# Patient Record
Sex: MALE | Race: WHITE | NOT HISPANIC OR LATINO | Employment: OTHER | URBAN - METROPOLITAN AREA
[De-identification: names, ages, dates, MRNs, and addresses within clinical notes are randomized per-mention and may not be internally consistent; named-entity substitution may affect disease eponyms.]

---

## 2024-01-18 PROBLEM — E53.8 B12 DEFICIENCY: Status: ACTIVE | Noted: 2024-01-18

## 2024-01-18 PROBLEM — M81.8 OTHER OSTEOPOROSIS WITHOUT CURRENT PATHOLOGICAL FRACTURE: Status: ACTIVE | Noted: 2024-01-18

## 2024-01-18 PROBLEM — K21.9 GASTROESOPHAGEAL REFLUX DISEASE WITHOUT ESOPHAGITIS: Status: ACTIVE | Noted: 2024-01-18

## 2024-01-18 PROBLEM — N40.0 BENIGN PROSTATIC HYPERPLASIA WITHOUT LOWER URINARY TRACT SYMPTOMS: Status: ACTIVE | Noted: 2024-01-18

## 2024-01-18 PROBLEM — K52.9 COLITIS: Status: ACTIVE | Noted: 2024-01-18

## 2024-01-18 PROBLEM — G47.09 OTHER INSOMNIA: Status: ACTIVE | Noted: 2024-01-18

## 2024-01-18 PROBLEM — G35 MULTIPLE SCLEROSIS (HCC): Status: ACTIVE | Noted: 2024-01-18

## 2024-01-18 PROBLEM — R26.2 AMBULATORY DYSFUNCTION: Status: ACTIVE | Noted: 2024-01-18

## 2024-01-18 PROBLEM — M48.061 SPINAL STENOSIS OF LUMBAR REGION: Status: ACTIVE | Noted: 2024-01-18

## 2024-01-18 PROBLEM — E55.9 VITAMIN D DEFICIENCY: Status: ACTIVE | Noted: 2024-01-18

## 2024-01-18 PROBLEM — K59.09 OTHER CONSTIPATION: Status: ACTIVE | Noted: 2024-01-18

## 2024-01-18 PROBLEM — K52.9 COLITIS: Status: RESOLVED | Noted: 2024-01-18 | Resolved: 2024-01-18

## 2024-01-18 PROBLEM — Z59.82 LACK OF ACCESS TO TRANSPORTATION: Status: ACTIVE | Noted: 2024-01-18

## 2024-01-18 PROBLEM — R60.0 BILATERAL LOWER EXTREMITY EDEMA: Status: ACTIVE | Noted: 2024-01-18

## 2024-01-19 ENCOUNTER — TELEPHONE (OUTPATIENT)
Age: 59
End: 2024-01-19

## 2024-01-19 NOTE — TELEPHONE ENCOUNTER
Michele from [facility] wanted to inform doctor that patient will be receiving care starting on 01/23/2024.

## 2024-01-22 NOTE — TELEPHONE ENCOUNTER
Patient contacted the office this morning looking for the name of the PT referral that he was referred to. [facility] health information given, phone number [phone]. Patient verbalized understanding.

## 2024-01-29 DIAGNOSIS — E53.8 B12 DEFICIENCY: ICD-10-CM

## 2024-01-29 DIAGNOSIS — R60.0 BILATERAL LOWER EXTREMITY EDEMA: Primary | ICD-10-CM

## 2024-01-29 DIAGNOSIS — E55.9 VITAMIN D DEFICIENCY: Primary | ICD-10-CM

## 2024-01-29 NOTE — TELEPHONE ENCOUNTER
Medication previously prescribed by last pcp and needs it switched.    Reason for call:   [x] Refill   [] Prior Auth  [] Other:     Office:   [x] PCP/Provider -   [] Specialty/Provider -     Medication:   Furosemide 20mg- take 1 tablet by mouth every morning      Pharmacy: [facility]    Does the patient have enough for 3 days?   [] Yes   [x] No - Send as HP to POD

## 2024-01-29 NOTE — TELEPHONE ENCOUNTER
Reason for call:   [x] Refill   [] Prior Auth  [] Other:     Office:   [x] PCP/Provider -   [] Specialty/Provider -     Medication: Cholecalciferol 400 mg units, take 400 units by mouth daily                      Vitamin B-12 1000 mcg, take 1000 mcg by mouth daily       Pharmacy: [facility]    Does the patient have enough for 3 days?   [] Yes   [x] No - Send as HP to POD

## 2024-01-30 ENCOUNTER — HOSPITAL ENCOUNTER (OUTPATIENT)
Dept: NON INVASIVE DIAGNOSTICS | Facility: HOSPITAL | Age: 59
Discharge: HOME | End: 2024-01-30
Payer: COMMERCIAL

## 2024-01-30 ENCOUNTER — HOSPITAL ENCOUNTER (OUTPATIENT)
Dept: RADIOLOGY | Facility: CLINIC | Age: 59
Discharge: HOME | End: 2024-01-30
Payer: COMMERCIAL

## 2024-01-30 VITALS
WEIGHT: 205.03 LBS | BODY MASS INDEX: 28.7 KG/M2 | SYSTOLIC BLOOD PRESSURE: 100 MMHG | DIASTOLIC BLOOD PRESSURE: 76 MMHG | HEIGHT: 71 IN

## 2024-01-30 DIAGNOSIS — M81.8 OTHER OSTEOPOROSIS WITHOUT CURRENT PATHOLOGICAL FRACTURE: ICD-10-CM

## 2024-01-30 DIAGNOSIS — R60.0 BILATERAL LOWER EXTREMITY EDEMA: ICD-10-CM

## 2024-01-30 LAB
AORTIC ROOT: 3.8 CM
AORTIC VALVE MEAN VELOCITY: 7.6 M/S
APICAL FOUR CHAMBER EJECTION FRACTION: 52 %
ASCENDING AORTA: 3.1 CM
AV AREA BY CONTINUOUS VTI: 3.3 CM2
AV AREA PEAK VELOCITY: 4.1 CM2
AV LVOT MEAN GRADIENT: 2 MMHG
AV LVOT PEAK GRADIENT: 3 MMHG
AV MEAN GRADIENT: 3 MMHG
AV PEAK GRADIENT: 4 MMHG
AV VALVE AREA: 3.32 CM2
AV VELOCITY RATIO: 0.91
BSA FOR ECHO PROCEDURE: 2.13 M2
DOP CALC AO PEAK VEL: 1.02 M/S
DOP CALC AO VTI: 26.9 CM
DOP CALC LVOT AREA: 4.52 CM2
DOP CALC LVOT CARDIAC INDEX: 3.22 L/MIN/M2
DOP CALC LVOT CARDIAC OUTPUT: 6.86 L/MIN
DOP CALC LVOT DIAMETER: 2.4 CM
DOP CALC LVOT PEAK VEL VTI: 19.75 CM
DOP CALC LVOT PEAK VEL: 0.93 M/S
DOP CALC LVOT STROKE INDEX: 40.4 ML/M2
DOP CALC LVOT STROKE VOLUME: 89.3
E WAVE DECELERATION TIME: 210 MS
E/A RATIO: 1.31
FRACTIONAL SHORTENING: 35 (ref 28–44)
INTERVENTRICULAR SEPTUM IN DIASTOLE (PARASTERNAL SHORT AXIS VIEW): 1.3 CM
INTERVENTRICULAR SEPTUM: 1.3 CM (ref 0.6–1.1)
LAAS-AP2: 26.2 CM2
LAAS-AP4: 21.1 CM2
LEFT ATRIUM SIZE: 3.4 CM
LEFT ATRIUM VOLUME (MOD BIPLANE): 77 ML
LEFT ATRIUM VOLUME INDEX (MOD BIPLANE): 36.2 ML/M2
LEFT INTERNAL DIMENSION IN SYSTOLE: 3 CM (ref 2.1–4)
LEFT VENTRICLE DIASTOLIC VOLUME (MOD BIPLANE): 109 ML
LEFT VENTRICLE DIASTOLIC VOLUME INDEX (MOD BIPLANE): 51.2 ML/M2
LEFT VENTRICLE SYSTOLIC VOLUME (MOD BIPLANE): 46 ML
LEFT VENTRICLE SYSTOLIC VOLUME INDEX (MOD BIPLANE): 21.6 ML/M2
LEFT VENTRICULAR INTERNAL DIMENSION IN DIASTOLE: 4.6 CM (ref 3.5–6)
LEFT VENTRICULAR POSTERIOR WALL IN END DIASTOLE: 1.3 CM
LEFT VENTRICULAR STROKE VOLUME: 62 ML
LV EF: 58 %
LVSV (TEICH): 62 ML
MV E'TISSUE VEL-LAT: 12 CM/S
MV E'TISSUE VEL-SEP: 8 CM/S
MV PEAK A VEL: 0.49 M/S
MV PEAK E VEL: 64 CM/S
MV STENOSIS PRESSURE HALF TIME: 61 MS
MV VALVE AREA P 1/2 METHOD: 3.61
RIGHT ATRIUM AREA SYSTOLE A4C: 21.2 CM2
RIGHT VENTRICLE ID DIMENSION: 4.4 CM
SL CV LEFT ATRIUM LENGTH A2C: 5.6 CM
SL CV LV EF: 60
SL CV PED ECHO LEFT VENTRICLE DIASTOLIC VOLUME (MOD BIPLANE) 2D: 95 ML
SL CV PED ECHO LEFT VENTRICLE SYSTOLIC VOLUME (MOD BIPLANE) 2D: 34 ML
TRICUSPID ANNULAR PLANE SYSTOLIC EXCURSION: 3.1 CM

## 2024-01-30 PROCEDURE — 77080 DXA BONE DENSITY AXIAL: CPT

## 2024-01-30 PROCEDURE — 93306 TTE W/DOPPLER COMPLETE: CPT | Performed by: INTERNAL MEDICINE

## 2024-01-30 PROCEDURE — 93306 TTE W/DOPPLER COMPLETE: CPT

## 2024-01-30 RX ORDER — LANOLIN ALCOHOL/MO/W.PET/CERES
1000 CREAM (GRAM) TOPICAL DAILY
Qty: 90 TABLET | Refills: 0 | Status: SHIPPED | OUTPATIENT
Start: 2024-01-30

## 2024-01-30 RX ORDER — OMEGA-3S/DHA/EPA/FISH OIL/D3 300MG-1000
400 CAPSULE ORAL DAILY
Qty: 90 TABLET | Refills: 1 | Status: SHIPPED | OUTPATIENT
Start: 2024-01-30

## 2024-01-30 RX ORDER — FUROSEMIDE 20 MG/1
20 TABLET ORAL EVERY MORNING
Qty: 90 TABLET | Refills: 1 | Status: SHIPPED | OUTPATIENT
Start: 2024-01-30

## 2024-01-30 NOTE — TELEPHONE ENCOUNTER
Patient called back confirming RX order for   urosemide (LASIX) 20 mg tablet Take 1 tablet (20 mg total) by mouth every morning     had been sent. Confirmed

## 2024-02-01 ENCOUNTER — TELEPHONE (OUTPATIENT)
Dept: FAMILY MEDICINE CLINIC | Facility: CLINIC | Age: 59
End: 2024-02-01

## 2024-02-01 NOTE — TELEPHONE ENCOUNTER
----- Message from Ko Castro PA-C sent at 2/1/2024 10:19 AM EST -----  Function is normal.  There is mild thickness in the wall of the left ventricle.  There is also mild regurgitation which means that blood mildly moves back from the left ventricle to the left atrium.  ----- Message -----  From: Martha Lopez  Sent: 2/1/2024  10:00 AM EST  To: Ko Castro PA-C    Patient is aware that his DXA is normal.  Would like more interpretation on his ECHO.  He said he saw it in My Chart but didn't understand it. I told him it was normal but he still asked if you can give him more information.

## 2024-02-01 NOTE — TELEPHONE ENCOUNTER
----- Message from Ko Castro PA-C sent at 2/1/2024  7:39 AM EST -----  No osteoporosis seen in lumbar spine or left hip and femur.

## 2024-02-01 NOTE — TELEPHONE ENCOUNTER
----- Message from Ko Castro PA-C sent at 2/1/2024 10:18 AM EST -----  Function is normal.  There is mild regurgitation from the left ventricle back to the left atrium.  Which means blood moves backwards very mildly.  There is very mild wall thickness in the left ventricle as well.  With function being normal, and very mild wall thickness,  ----- Message -----  From: Martha Lopez  Sent: 2/1/2024  10:00 AM EST  To: Ko Castro PA-C    Patient is aware that his DXA is normal.  Would like more interpretation on his ECHO.  He said he saw it in My Chart but didn't understand it. I told him it was normal but he still asked if you can give him more information.

## 2024-02-02 ENCOUNTER — PATIENT OUTREACH (OUTPATIENT)
Dept: FAMILY MEDICINE CLINIC | Facility: CLINIC | Age: 59
End: 2024-02-02

## 2024-02-02 NOTE — PROGRESS NOTES
[facility]OR received a referral from KANWAL Herring to assist patient with applying for transportation services.    FLRO contacted Clyde to discuss the referral. He was completing a PT session and stated he would call this writer back.     If no contact, will outreach again next week.

## 2024-02-04 ENCOUNTER — TELEPHONE (OUTPATIENT)
Dept: OTHER | Facility: OTHER | Age: 59
End: 2024-02-04

## 2024-02-04 DIAGNOSIS — K59.09 OTHER CONSTIPATION: Primary | ICD-10-CM

## 2024-02-05 ENCOUNTER — APPOINTMENT (OUTPATIENT)
Dept: LAB | Facility: HOSPITAL | Age: 59
End: 2024-02-05
Payer: COMMERCIAL

## 2024-02-05 DIAGNOSIS — N40.0 BENIGN PROSTATIC HYPERPLASIA WITHOUT LOWER URINARY TRACT SYMPTOMS: ICD-10-CM

## 2024-02-05 DIAGNOSIS — R60.0 BILATERAL LOWER EXTREMITY EDEMA: ICD-10-CM

## 2024-02-05 DIAGNOSIS — Z12.5 SCREENING FOR MALIGNANT NEOPLASM OF PROSTATE: ICD-10-CM

## 2024-02-05 DIAGNOSIS — Z13.6 SCREENING FOR CARDIOVASCULAR CONDITION: ICD-10-CM

## 2024-02-05 DIAGNOSIS — E55.9 VITAMIN D DEFICIENCY: ICD-10-CM

## 2024-02-05 DIAGNOSIS — E53.8 B12 DEFICIENCY: ICD-10-CM

## 2024-02-05 LAB
25(OH)D3 SERPL-MCNC: 33.9 NG/ML (ref 30–100)
ALBUMIN SERPL BCP-MCNC: 4.3 G/DL (ref 3.5–5)
ALP SERPL-CCNC: 53 U/L (ref 34–104)
ALT SERPL W P-5'-P-CCNC: 17 U/L (ref 7–52)
ANION GAP SERPL CALCULATED.3IONS-SCNC: 5 MMOL/L
AST SERPL W P-5'-P-CCNC: 17 U/L (ref 13–39)
BASOPHILS # BLD AUTO: 0.03 THOUSANDS/ÂΜL (ref 0–0.1)
BASOPHILS NFR BLD AUTO: 1 % (ref 0–1)
BILIRUB SERPL-MCNC: 0.75 MG/DL (ref 0.2–1)
BUN SERPL-MCNC: 20 MG/DL (ref 5–25)
CALCIUM SERPL-MCNC: 9.2 MG/DL (ref 8.4–10.2)
CHLORIDE SERPL-SCNC: 104 MMOL/L (ref 96–108)
CHOLEST SERPL-MCNC: 161 MG/DL
CO2 SERPL-SCNC: 29 MMOL/L (ref 21–32)
CREAT SERPL-MCNC: 0.84 MG/DL (ref 0.6–1.3)
EOSINOPHIL # BLD AUTO: 0.08 THOUSAND/ÂΜL (ref 0–0.61)
EOSINOPHIL NFR BLD AUTO: 2 % (ref 0–6)
ERYTHROCYTE [DISTWIDTH] IN BLOOD BY AUTOMATED COUNT: 11.9 % (ref 11.6–15.1)
GFR SERPL CREATININE-BSD FRML MDRD: 95 ML/MIN/1.73SQ M
GLUCOSE P FAST SERPL-MCNC: 91 MG/DL (ref 65–99)
HCT VFR BLD AUTO: 41.1 % (ref 36.5–49.3)
HDLC SERPL-MCNC: 47 MG/DL
HGB BLD-MCNC: 14 G/DL (ref 12–17)
IMM GRANULOCYTES # BLD AUTO: 0.01 THOUSAND/UL (ref 0–0.2)
IMM GRANULOCYTES NFR BLD AUTO: 0 % (ref 0–2)
LDLC SERPL CALC-MCNC: 101 MG/DL (ref 0–100)
LYMPHOCYTES # BLD AUTO: 1.71 THOUSANDS/ÂΜL (ref 0.6–4.47)
LYMPHOCYTES NFR BLD AUTO: 33 % (ref 14–44)
MCH RBC QN AUTO: 30.9 PG (ref 26.8–34.3)
MCHC RBC AUTO-ENTMCNC: 34.1 G/DL (ref 31.4–37.4)
MCV RBC AUTO: 91 FL (ref 82–98)
MONOCYTES # BLD AUTO: 0.57 THOUSAND/ÂΜL (ref 0.17–1.22)
MONOCYTES NFR BLD AUTO: 11 % (ref 4–12)
NEUTROPHILS # BLD AUTO: 2.77 THOUSANDS/ÂΜL (ref 1.85–7.62)
NEUTS SEG NFR BLD AUTO: 53 % (ref 43–75)
NONHDLC SERPL-MCNC: 114 MG/DL
NRBC BLD AUTO-RTO: 0 /100 WBCS
PLATELET # BLD AUTO: 178 THOUSANDS/UL (ref 149–390)
PMV BLD AUTO: 9.2 FL (ref 8.9–12.7)
POTASSIUM SERPL-SCNC: 4.3 MMOL/L (ref 3.5–5.3)
PROT SERPL-MCNC: 6.8 G/DL (ref 6.4–8.4)
PSA SERPL-MCNC: 1.7 NG/ML (ref 0–4)
RBC # BLD AUTO: 4.53 MILLION/UL (ref 3.88–5.62)
SODIUM SERPL-SCNC: 138 MMOL/L (ref 135–147)
TRIGL SERPL-MCNC: 65 MG/DL
VIT B12 SERPL-MCNC: 780 PG/ML (ref 180–914)
WBC # BLD AUTO: 5.17 THOUSAND/UL (ref 4.31–10.16)

## 2024-02-05 PROCEDURE — 36415 COLL VENOUS BLD VENIPUNCTURE: CPT

## 2024-02-05 PROCEDURE — 82607 VITAMIN B-12: CPT

## 2024-02-05 PROCEDURE — 80061 LIPID PANEL: CPT

## 2024-02-05 PROCEDURE — 80053 COMPREHEN METABOLIC PANEL: CPT

## 2024-02-05 PROCEDURE — 85025 COMPLETE CBC W/AUTO DIFF WBC: CPT

## 2024-02-05 PROCEDURE — 82306 VITAMIN D 25 HYDROXY: CPT

## 2024-02-05 PROCEDURE — G0103 PSA SCREENING: HCPCS

## 2024-02-05 RX ORDER — SACCHAROMYCES BOULARDII 250 MG
250 CAPSULE ORAL 2 TIMES DAILY
Qty: 180 CAPSULE | Refills: 1 | Status: SHIPPED | OUTPATIENT
Start: 2024-02-05 | End: 2024-02-15 | Stop reason: SDUPTHER

## 2024-02-05 NOTE — TELEPHONE ENCOUNTER
Spoke to patient letting him know that probiotics can be bought OTC.  However, he said his insurance has always covered them.  Right now, he is taking Florastor, so he asked if you would send that in so he can see if that will be covered again.  He states he is not having any specific bowel issues, just has always taken a probiotic.

## 2024-02-06 ENCOUNTER — TELEPHONE (OUTPATIENT)
Dept: FAMILY MEDICINE CLINIC | Facility: CLINIC | Age: 59
End: 2024-02-06

## 2024-02-12 DIAGNOSIS — G47.09 OTHER INSOMNIA: ICD-10-CM

## 2024-02-12 DIAGNOSIS — K21.9 GASTROESOPHAGEAL REFLUX DISEASE WITHOUT ESOPHAGITIS: ICD-10-CM

## 2024-02-12 DIAGNOSIS — G35 MULTIPLE SCLEROSIS (HCC): Primary | ICD-10-CM

## 2024-02-12 NOTE — TELEPHONE ENCOUNTER
Reason for call:   [x] Refill   [] Prior Auth  [] Other:     Office:   [x] PCP/Provider -   [] Specialty/Provider -     Medication:     Pregabalin 50 mg capsule taken by mouth 2x daily #60 tabs     Pantoprazole 40 mg tablet taken by mouth once daily #90 tabs     Hydroxyzine 25 mg tablet taken by mouth daily at bedtime #90 tabs       Pharmacy:   [facility] #[id number] - [place] -   [address] [phone]     Does the patient have enough for 3 days?   [x] Yes   [] No - Send as HP to POD

## 2024-02-13 RX ORDER — PANTOPRAZOLE SODIUM 40 MG/1
40 TABLET, DELAYED RELEASE ORAL EVERY MORNING
Qty: 90 TABLET | Refills: 1 | Status: SHIPPED | OUTPATIENT
Start: 2024-02-13 | End: 2024-02-15 | Stop reason: SDUPTHER

## 2024-02-13 RX ORDER — PREGABALIN 50 MG/1
50 CAPSULE ORAL 2 TIMES DAILY
Qty: 60 CAPSULE | Refills: 0 | Status: SHIPPED | OUTPATIENT
Start: 2024-02-13 | End: 2024-02-15 | Stop reason: SDUPTHER

## 2024-02-13 RX ORDER — HYDROXYZINE HYDROCHLORIDE 25 MG/1
25 TABLET, FILM COATED ORAL
Qty: 90 TABLET | Refills: 1 | Status: SHIPPED | OUTPATIENT
Start: 2024-02-13 | End: 2024-02-15 | Stop reason: SDUPTHER

## 2024-02-14 ENCOUNTER — PATIENT OUTREACH (OUTPATIENT)
Dept: FAMILY MEDICINE CLINIC | Facility: CLINIC | Age: 59
End: 2024-02-14

## 2024-02-14 NOTE — PROGRESS NOTES
[facility] contacted Clyde to assist with an application for transportation services.     An application for STS was completed and the certification of disability form was faxed to the PCP office for review and completion. Clyde states he has an appt with the PCP tomorrow and will confirm it was received.     Will complete a chart review next week to obtain the form and email the completed application to [facility] for processing.     Will outreach patient one week after application is submitted for a status update.

## 2024-02-15 ENCOUNTER — OFFICE VISIT (OUTPATIENT)
Dept: FAMILY MEDICINE CLINIC | Facility: CLINIC | Age: 59
End: 2024-02-15
Payer: COMMERCIAL

## 2024-02-15 VITALS
BODY MASS INDEX: 28.6 KG/M2 | HEIGHT: 71 IN | HEART RATE: 68 BPM | SYSTOLIC BLOOD PRESSURE: 102 MMHG | DIASTOLIC BLOOD PRESSURE: 66 MMHG | OXYGEN SATURATION: 99 % | TEMPERATURE: 94.5 F

## 2024-02-15 DIAGNOSIS — R60.0 BILATERAL LOWER EXTREMITY EDEMA: ICD-10-CM

## 2024-02-15 DIAGNOSIS — E53.8 B12 DEFICIENCY: ICD-10-CM

## 2024-02-15 DIAGNOSIS — Z12.11 SCREEN FOR COLON CANCER: ICD-10-CM

## 2024-02-15 DIAGNOSIS — R26.2 AMBULATORY DYSFUNCTION: ICD-10-CM

## 2024-02-15 DIAGNOSIS — G35 MULTIPLE SCLEROSIS (HCC): ICD-10-CM

## 2024-02-15 DIAGNOSIS — K59.09 OTHER CONSTIPATION: ICD-10-CM

## 2024-02-15 DIAGNOSIS — Z00.00 ANNUAL PHYSICAL EXAM: Primary | ICD-10-CM

## 2024-02-15 DIAGNOSIS — E55.9 VITAMIN D DEFICIENCY: ICD-10-CM

## 2024-02-15 DIAGNOSIS — N40.1 BENIGN PROSTATIC HYPERPLASIA WITH LOWER URINARY TRACT SYMPTOMS, SYMPTOM DETAILS UNSPECIFIED: ICD-10-CM

## 2024-02-15 DIAGNOSIS — G47.09 OTHER INSOMNIA: ICD-10-CM

## 2024-02-15 DIAGNOSIS — K21.9 GASTROESOPHAGEAL REFLUX DISEASE WITHOUT ESOPHAGITIS: ICD-10-CM

## 2024-02-15 PROCEDURE — 99396 PREV VISIT EST AGE 40-64: CPT

## 2024-02-15 RX ORDER — PANTOPRAZOLE SODIUM 40 MG/1
40 TABLET, DELAYED RELEASE ORAL EVERY MORNING
Qty: 90 TABLET | Refills: 1 | Status: SHIPPED | OUTPATIENT
Start: 2024-02-15

## 2024-02-15 RX ORDER — OMEGA-3S/DHA/EPA/FISH OIL/D3 300MG-1000
400 CAPSULE ORAL DAILY
Qty: 90 TABLET | Refills: 1 | Status: SHIPPED | OUTPATIENT
Start: 2024-02-15

## 2024-02-15 RX ORDER — HYDROXYZINE HYDROCHLORIDE 25 MG/1
25 TABLET, FILM COATED ORAL
Qty: 90 TABLET | Refills: 1 | Status: SHIPPED | OUTPATIENT
Start: 2024-02-15

## 2024-02-15 RX ORDER — PREGABALIN 50 MG/1
50 CAPSULE ORAL 2 TIMES DAILY
Qty: 60 CAPSULE | Refills: 0 | Status: SHIPPED | OUTPATIENT
Start: 2024-02-15

## 2024-02-15 RX ORDER — FUROSEMIDE 20 MG/1
20 TABLET ORAL EVERY MORNING
Qty: 90 TABLET | Refills: 1 | Status: SHIPPED | OUTPATIENT
Start: 2024-02-15

## 2024-02-15 RX ORDER — LANOLIN ALCOHOL/MO/W.PET/CERES
1000 CREAM (GRAM) TOPICAL DAILY
Qty: 90 TABLET | Refills: 0 | Status: SHIPPED | OUTPATIENT
Start: 2024-02-15

## 2024-02-15 RX ORDER — SACCHAROMYCES BOULARDII 250 MG
250 CAPSULE ORAL 2 TIMES DAILY
Qty: 180 CAPSULE | Refills: 1 | Status: SHIPPED | OUTPATIENT
Start: 2024-02-15

## 2024-02-15 RX ORDER — DOCUSATE SODIUM -SENNOSIDES 50; 8.6 MG/1; MG/1
1 TABLET, COATED ORAL 2 TIMES DAILY
COMMUNITY
Start: 2023-12-11 | End: 2024-02-15

## 2024-02-15 RX ORDER — TAMSULOSIN HYDROCHLORIDE 0.4 MG/1
0.8 CAPSULE ORAL
Qty: 90 CAPSULE | Refills: 1 | Status: SHIPPED | OUTPATIENT
Start: 2024-02-15 | End: 2024-02-20 | Stop reason: SDUPTHER

## 2024-02-15 RX ORDER — SENNOSIDES A AND B 8.6 MG/1
1 TABLET, FILM COATED ORAL 2 TIMES DAILY
Qty: 60 TABLET | Refills: 2 | Status: SHIPPED | OUTPATIENT
Start: 2024-02-15

## 2024-02-15 NOTE — ASSESSMENT & PLAN NOTE
Patient has significant ambulatory dysfunction and needs help due to activities of daily life.  Patient currently gets help from niece and nephew to complete these, and he has at home PT/OT.

## 2024-02-15 NOTE — ASSESSMENT & PLAN NOTE
Patient has PT/OT and home health and home.  Patient is not currently on medication or medical treatment for MS, but has follow-up with neurology planning to schedule after this appointment.  Stressed importance of adequate follow-up to help with progression.  Patient understands and agrees to plan.

## 2024-02-15 NOTE — PROGRESS NOTES
ADULT ANNUAL PHYSICAL  [facility]    NAME: Clyde Malave  AGE: 58 y.o. SEX: male  : 1965     DATE: 2/15/2024     Assessment and Plan:     Problem List Items Addressed This Visit          Digestive    Gastroesophageal reflux disease without esophagitis     Stable. Continue pantoprazole 40 mg daily. Contact office with symptoms.          Relevant Medications    pantoprazole (PROTONIX) 40 mg tablet       Nervous and Auditory    Multiple sclerosis (HCC)     Patient has PT/OT and home health and home.  Patient is not currently on medication or medical treatment for MS, but has follow-up with neurology planning to schedule after this appointment.  Stressed importance of adequate follow-up to help with progression.  Patient understands and agrees to plan.         Relevant Medications    pregabalin (LYRICA) 50 mg capsule       Other    Bilateral lower extremity edema     Echocardiogram and lab work normal.  Patient educated on importance of compression socks and leg elevation along with a low-sodium/sugar diet.  Patient is to contact office or go to emergency room if shortness of breath or severe leg swelling occurs.         Relevant Medications    furosemide (LASIX) 20 mg tablet    Other constipation     Stable.  Continue senna 2 times daily.         Relevant Medications    saccharomyces boulardii (FLORASTOR) 250 mg capsule    senna (SENOKOT) 8.6 MG tablet    B12 deficiency     Stable.  Continue B12 supplement.         Relevant Medications    vitamin B-12 (VITAMIN B-12) 1,000 mcg tablet    Vitamin D deficiency     Stable.  Continue D3 supplement.         Relevant Medications    cholecalciferol (VITAMIN D3) 400 units tablet    Other insomnia     Stable on hydroxyzine 25 mg at bedtime. Continue treatment.          Relevant Medications    hydrOXYzine HCL (ATARAX) 25 mg tablet    Ambulatory dysfunction     Patient has significant ambulatory dysfunction and  needs help due to activities of daily life.  Patient currently gets help from niece and nephew to complete these, and he has at home PT/OT.          Other Visit Diagnoses       Annual physical exam    -  Primary    Benign prostatic hyperplasia with lower urinary tract symptoms, symptom details unspecified        Relevant Medications    tamsulosin (FLOMAX) 0.4 mg    Screen for colon cancer                Immunizations and preventive care screenings were discussed with patient today. Appropriate education was printed on patient's after visit summary.    Discussed risks and benefits of prostate cancer screening. We discussed the controversial history of PSA screening for prostate cancer in the [place] as well as the risk of over detection and over treatment of prostate cancer by way of PSA screening.  The patient understands that PSA blood testing is an imperfect way to screen for prostate cancer and that elevated PSA levels in the blood may also be caused by infection, inflammation, prostatic trauma or manipulation, urological procedures, or by benign prostatic enlargement.    The role of the digital rectal examination in prostate cancer screening was also discussed and I discussed with him that there is large interobserver variability in the findings of digital rectal examination.    Counseling:  Alcohol/drug use: discussed moderation in alcohol intake, the recommendations for healthy alcohol use, and avoidance of illicit drug use.  Dental Health: discussed importance of regular tooth brushing, flossing, and dental visits.  Injury prevention: discussed safety/seat belts, safety helmets, smoke detectors, carbon dioxide detectors, and smoking near bedding or upholstery.  Sexual health: discussed sexually transmitted diseases, partner selection, use of condoms, avoidance of unintended pregnancy, and contraceptive alternatives.  Exercise: the importance of regular exercise/physical activity was discussed. Recommend  exercise 3-5 times per week for at least 30 minutes.          Return in about 3 months (around 5/15/2024) for Next scheduled follow up.     Chief Complaint:     Chief Complaint   Patient presents with    Follow-up     Go over labs, meds and echo.  Has a disability hearing in April      History of Present Illness:     Adult Annual Physical   Patient here for a comprehensive physical exam. The patient reports problems - patient has ambulatory dysfunction and is unable to complete activities of daily life.  His niece and nephew help him complete activities of daily life.  He has urology follow-up scheduled, and is calling neurology after this appointment to schedule a consult with them.  Patient has no other concerns .    Diet and Physical Activity  Diet/Nutrition: well balanced diet, limited junk food, low calorie diet, low fat diet, and low carb diet.   Exercise: no formal exercise and ambulatory dysfunction .      Depression Screening  PHQ-2/9 Depression Screening           General Health  Sleep: sleeps well, gets 7-8 hours of sleep on average, and gets up 3 times nightly for urination .   Hearing: normal - bilateral.  Vision: no vision problems.   Dental: regular dental visits.        Health  Symptoms include: urinary frequency and nocturia    Advanced Care Planning  Do you have an advanced directive? no  Do you have a durable medical power of ? no  ACP document given to patient? no     Review of Systems:     Review of Systems   Constitutional:  Positive for fatigue. Negative for appetite change, chills, diaphoresis and fever.   HENT:  Negative for congestion, ear discharge, ear pain, postnasal drip, rhinorrhea, sinus pressure, sinus pain, sneezing and sore throat.    Eyes:  Negative for pain, discharge, redness, itching and visual disturbance.   Respiratory:  Negative for apnea, cough, chest tightness, shortness of breath and wheezing.    Cardiovascular:  Negative for chest pain, palpitations and leg  swelling.   Gastrointestinal:  Negative for abdominal pain, blood in stool, constipation, diarrhea, nausea and vomiting.   Endocrine: Negative for cold intolerance, heat intolerance, polydipsia and polyuria.   Genitourinary:  Negative for dysuria, flank pain, frequency, hematuria and urgency.   Musculoskeletal:  Positive for gait problem. Negative for arthralgias, back pain, myalgias, neck pain and neck stiffness.   Skin:  Negative for color change and rash.   Allergic/Immunologic: Negative.    Neurological:  Positive for weakness. Negative for dizziness, tremors, seizures, syncope, facial asymmetry, speech difficulty, light-headedness, numbness and headaches.   Hematological:  Negative for adenopathy. Does not bruise/bleed easily.   Psychiatric/Behavioral:  Positive for confusion. Negative for agitation, decreased concentration, dysphoric mood, hallucinations, self-injury, sleep disturbance and suicidal ideas. The patient is not nervous/anxious and is not hyperactive.    All other systems reviewed and are negative.     Past Medical History:     Past Medical History:   Diagnosis Date    Colitis     Enlarged prostate 2021    Gall bladder pain 2023    Multiple sclerosis (HCC) 2000    Osteoporosis     Spinal stenosis 2020    Water retention 12/2023      Past Surgical History:     Past Surgical History:   Procedure Laterality Date    COLONOSCOPY N/A 02/2023    HERNIA REPAIR      INGUINAL HERNIA REPAIR Left       Family History:     Family History   Problem Relation Age of Onset    Stroke Mother     Gallbladder disease Mother     Hypertension Mother     Heart attack Father     Hypertension Sister     Diabetes Brother     No Known Problems Maternal Grandmother     No Known Problems Maternal Grandfather     No Known Problems Paternal Grandmother     No Known Problems Paternal Grandfather     No Known Problems Maternal Aunt     No Known Problems Maternal Uncle     No Known Problems Paternal Aunt     No Known Problems  Paternal Uncle     No Known Problems Cousin     Colon cancer Other       Social History:     Social History     Socioeconomic History    Marital status: Single     Spouse name: None    Number of children: 0    Years of education: None    Highest education level: Doctorate   Occupational History    Occupation: retired   Tobacco Use    Smoking status: Never     Passive exposure: Never    Smokeless tobacco: Never   Vaping Use    Vaping status: Never Used   Substance and Sexual Activity    Alcohol use: Not Currently    Drug use: Never    Sexual activity: Not Currently     Partners: Female   Other Topics Concern    None   Social History Narrative    None     Social Determinants of Health     Financial Resource Strain: Not on file   Food Insecurity: Not on file   Transportation Needs: Not on file   Physical Activity: Not on file   Stress: Not on file   Social Connections: Not on file   Intimate Partner Violence: Not on file   Housing Stability: Not on file      Current Medications:     Current Outpatient Medications   Medication Sig Dispense Refill    cholecalciferol (VITAMIN D3) 400 units tablet Take 1 tablet (400 Units total) by mouth daily 90 tablet 1    furosemide (LASIX) 20 mg tablet Take 1 tablet (20 mg total) by mouth every morning 90 tablet 1    hydrOXYzine HCL (ATARAX) 25 mg tablet Take 1 tablet (25 mg total) by mouth daily at bedtime 90 tablet 1    pantoprazole (PROTONIX) 40 mg tablet Take 1 tablet (40 mg total) by mouth every morning 90 tablet 1    pregabalin (LYRICA) 50 mg capsule Take 1 capsule (50 mg total) by mouth 2 (two) times a day 60 capsule 0    saccharomyces boulardii (FLORASTOR) 250 mg capsule Take 1 capsule (250 mg total) by mouth 2 (two) times a day 180 capsule 1    senna (SENOKOT) 8.6 MG tablet Take 1 tablet (8.6 mg total) by mouth 2 (two) times a day 60 tablet 2    tamsulosin (FLOMAX) 0.4 mg Take 2 capsules (0.8 mg total) by mouth daily with dinner 90 capsule 1    vitamin B-12 (VITAMIN B-12)  "1,000 mcg tablet Take 1 tablet (1,000 mcg total) by mouth daily 90 tablet 0     No current facility-administered medications for this visit.      Allergies:     Allergies   Allergen Reactions    Penicillins Hives      Physical Exam:     /66 (BP Location: Right arm, Patient Position: Sitting)   Pulse 68   Temp (!) 94.5 °F (34.7 °C) (Tympanic)   Ht 5' 11\" (1.803 m)   SpO2 99%   BMI 28.60 kg/m²     Physical Exam  Vitals and nursing note reviewed.   Constitutional:       General: He is not in acute distress.     Appearance: Normal appearance. He is well-developed and normal weight. He is not ill-appearing, toxic-appearing or diaphoretic.   HENT:      Head: Normocephalic and atraumatic.      Right Ear: Tympanic membrane normal.      Left Ear: Tympanic membrane normal.      Nose: Nose normal.      Mouth/Throat:      Mouth: Mucous membranes are moist.      Pharynx: Oropharynx is clear.   Eyes:      Extraocular Movements: Extraocular movements intact.      Conjunctiva/sclera: Conjunctivae normal.      Pupils: Pupils are equal, round, and reactive to light.   Cardiovascular:      Rate and Rhythm: Normal rate and regular rhythm.      Pulses: Normal pulses.      Heart sounds: Normal heart sounds. No murmur heard.  Pulmonary:      Effort: Pulmonary effort is normal. No respiratory distress.      Breath sounds: Normal breath sounds. No wheezing.   Chest:      Chest wall: No tenderness.   Abdominal:      General: Bowel sounds are normal.      Palpations: Abdomen is soft. There is no mass.      Tenderness: There is no abdominal tenderness.   Musculoskeletal:         General: No swelling or tenderness. Normal range of motion.      Cervical back: Normal range of motion and neck supple. No tenderness.      Right lower leg: No edema.      Left lower leg: No edema.   Lymphadenopathy:      Cervical: No cervical adenopathy.   Skin:     General: Skin is warm and dry.      Capillary Refill: Capillary refill takes less than 2 " seconds.      Findings: No erythema or rash.   Neurological:      General: No focal deficit present.      Mental Status: He is alert and oriented to person, place, and time. Mental status is at baseline.      Cranial Nerves: No cranial nerve deficit.      Motor: Weakness present.      Coordination: Coordination abnormal.      Gait: Gait abnormal.   Psychiatric:         Mood and Affect: Mood normal.         Behavior: Behavior normal.         Thought Content: Thought content normal.         Judgment: Judgment normal.          Ko Castro PA-C  [facility]

## 2024-02-15 NOTE — ASSESSMENT & PLAN NOTE
Echocardiogram and lab work normal.  Patient educated on importance of compression socks and leg elevation along with a low-sodium/sugar diet.  Patient is to contact office or go to emergency room if shortness of breath or severe leg swelling occurs.

## 2024-02-20 ENCOUNTER — NURSE TRIAGE (OUTPATIENT)
Age: 59
End: 2024-02-20

## 2024-02-20 ENCOUNTER — CONSULT (OUTPATIENT)
Dept: UROLOGY | Facility: CLINIC | Age: 59
End: 2024-02-20
Payer: COMMERCIAL

## 2024-02-20 VITALS
SYSTOLIC BLOOD PRESSURE: 110 MMHG | HEIGHT: 71 IN | HEART RATE: 80 BPM | TEMPERATURE: 98 F | DIASTOLIC BLOOD PRESSURE: 70 MMHG | OXYGEN SATURATION: 99 % | WEIGHT: 215.2 LBS | BODY MASS INDEX: 30.13 KG/M2

## 2024-02-20 DIAGNOSIS — N40.1 BENIGN PROSTATIC HYPERPLASIA WITH LOWER URINARY TRACT SYMPTOMS, SYMPTOM DETAILS UNSPECIFIED: ICD-10-CM

## 2024-02-20 DIAGNOSIS — N40.0 BENIGN PROSTATIC HYPERPLASIA WITHOUT LOWER URINARY TRACT SYMPTOMS: ICD-10-CM

## 2024-02-20 PROCEDURE — 99203 OFFICE O/P NEW LOW 30 MIN: CPT | Performed by: UROLOGY

## 2024-02-20 RX ORDER — TAMSULOSIN HYDROCHLORIDE 0.4 MG/1
0.4 CAPSULE ORAL 2 TIMES DAILY
Qty: 180 CAPSULE | Refills: 3 | Status: SHIPPED | OUTPATIENT
Start: 2024-02-20

## 2024-02-20 RX ORDER — TAMSULOSIN HYDROCHLORIDE 0.4 MG/1
0.8 CAPSULE ORAL 2 TIMES DAILY
Qty: 90 CAPSULE | Refills: 3 | Status: SHIPPED | OUTPATIENT
Start: 2024-02-20 | End: 2024-02-20 | Stop reason: SDUPTHER

## 2024-02-20 NOTE — TELEPHONE ENCOUNTER
"Answer Assessment - Initial Assessment Questions  1. REASON FOR CALL or QUESTION: \"What is your reason for calling today?\" or \"How can I best help you?\" or \"What question do you have that I can help answer?\"      Pt called in stating he was seen in office today and was under the impression that he was to be taking 1 flomax in the morning and 1 in the evening. States his script says take 2 in the morning and 2 in the evening. He would like to know if this was a mistake or if this was part of the plan. Please advise.    Protocols used: Information Only Call - No Triage-ADULT-[place]    "

## 2024-02-20 NOTE — TELEPHONE ENCOUNTER
Pt called back. Reviewed message w/pt . He will take one Flomax tablet in the morning and one tablet in the evening.

## 2024-02-20 NOTE — TELEPHONE ENCOUNTER
thanks for catching that. i [facility] should be tamsulosin 0.4mg capsule take one capsule AM and one capsule PM  total 180 capsules for 3 months with 3 refills for the year

## 2024-02-20 NOTE — PROGRESS NOTES
UROLOGY PROGRESS NOTE         NAME: Clyde Malave  AGE: 58 y.o. SEX: male  : 1965   MRN: [id number]    DATE: 2024  TIME: 10:47 AM    Assessment and Plan      Impression:   1. Benign prostatic hyperplasia without lower urinary tract symptoms  -     Ambulatory Referral to Urology    2. Benign prostatic hyperplasia with lower urinary tract symptoms, symptom details unspecified  -     tamsulosin (FLOMAX) 0.4 mg; Take 2 capsules (0.8 mg total) by mouth 2 (two) times a day    Moderate BPH symptoms.  Symptoms are bothersome to him today despite taking 2 Flomax pills in the evening.  After discussing options he would like to proceed with a Rezum procedure.  We will need his records first     Plan: Obtain his urologic records.  Sounds like he seen 3 previous urologist.  Consider Rezum procedure once that is reviewed.  Take the Flomax twice daily instead of 2 tablets in the evening.      Chief Complaint     Chief Complaint   Patient presents with    Benign Prostatic Hypertrophy    Nocturia     History of Present Illness     HPI: Clyde Malave is a 58 y.o. year old male who presents with history of BPH.  He developed urinary retention couple years ago.  He has had MS for over 20 years.  He has some ambulatory dysfunction.  He went any retention he has been on twice daily Flomax for some time now he seen multiple urologist as he is moved from place to place.  Currently living with his niece and nephew.  Previously scheduled for a Rezum procedure.  This was canceled when he moved to this area.  He had cystoscopy.  He had PVRs.  Currently his AUA symptom score is 12-13.  Nocturia 2-3 times per night.  Slow flow intermittent flow at times.  Normal PSA 1.7 he is also urodynamic testing in the past couple times in [place].  Will see him back in a few weeks get him signed up for care everywhere so we can have access to these records.              The following portions of the patient's history were reviewed and  "updated as appropriate: allergies, current medications, past family history, past medical history, past social history, past surgical history and problem list.  Past Medical History:   Diagnosis Date    Colitis     Enlarged prostate 2021    Gall bladder pain 2023    Multiple sclerosis (HCC) 2000    Osteoporosis     Spinal stenosis 2020    Water retention 12/2023     Past Surgical History:   Procedure Laterality Date    COLONOSCOPY N/A 02/2023    HERNIA REPAIR      INGUINAL HERNIA REPAIR Left      shoulder  Review of Systems     Const: Denies chills, fever and weight loss.  CV: Denies chest pain.  Resp: Denies SOB.  GI: Denies abdominal pain, nausea and vomiting.  : Denies symptoms other than stated above.  Musculo: Denies back pain.    Objective   /70   Pulse 80   Temp 98 °F (36.7 °C)   Ht 5' 11\" (1.803 m)   Wt 97.6 kg (215 lb 3.2 oz)   SpO2 99%   BMI 30.01 kg/m²     Physical Exam  Const: Appears healthy and well developed. No signs of acute distress present.  Resp: Respirations are regular and unlabored.   CV: Rate is regular. Rhythm is regular.  Abdomen: Abdomen is soft, nontender, and nondistended. Kidneys are not palpable.  : Penis testicles epididymis normal.  No hernias.  No SP tenderness no CVA tenderness.  Prostate 1+ benign.  Psych: Patient's attitude is cooperative. Mood is normal. Affect is normal.    Current Medications     Current Outpatient Medications:     cholecalciferol (VITAMIN D3) 400 units tablet, Take 1 tablet (400 Units total) by mouth daily, Disp: 90 tablet, Rfl: 1    furosemide (LASIX) 20 mg tablet, Take 1 tablet (20 mg total) by mouth every morning, Disp: 90 tablet, Rfl: 1    hydrOXYzine HCL (ATARAX) 25 mg tablet, Take 1 tablet (25 mg total) by mouth daily at bedtime, Disp: 90 tablet, Rfl: 1    pantoprazole (PROTONIX) 40 mg tablet, Take 1 tablet (40 mg total) by mouth every morning, Disp: 90 tablet, Rfl: 1    pregabalin (LYRICA) 50 mg capsule, Take 1 capsule (50 mg total) by " mouth 2 (two) times a day, Disp: 60 capsule, Rfl: 0    saccharomyces boulardii (FLORASTOR) 250 mg capsule, Take 1 capsule (250 mg total) by mouth 2 (two) times a day, Disp: 180 capsule, Rfl: 1    senna (SENOKOT) 8.6 MG tablet, Take 1 tablet (8.6 mg total) by mouth 2 (two) times a day, Disp: 60 tablet, Rfl: 2    tamsulosin (FLOMAX) 0.4 mg, Take 2 capsules (0.8 mg total) by mouth 2 (two) times a day, Disp: 90 capsule, Rfl: 3    vitamin B-12 (VITAMIN B-12) 1,000 mcg tablet, Take 1 tablet (1,000 mcg total) by mouth daily, Disp: 90 tablet, Rfl: 0        Frank D'Amico, MD

## 2024-02-21 ENCOUNTER — TELEPHONE (OUTPATIENT)
Age: 59
End: 2024-02-21

## 2024-02-21 NOTE — TELEPHONE ENCOUNTER
Spoke to Shweta at [facility] & she will have their RN call so I can clarify his order.  Please transfer call directly to me.  Also faxed office notes as requested.

## 2024-02-21 NOTE — TELEPHONE ENCOUNTER
Shweta from [facility] called regarding the referral they received. Please fax over the office visit notes from the 2/15/24 visit to [phone] Attn Shweta.  She is also asking for clarification on the order that was placed as to what is being ordered.  Please return her call.

## 2024-02-22 ENCOUNTER — TELEPHONE (OUTPATIENT)
Age: 59
End: 2024-02-22

## 2024-02-22 NOTE — TELEPHONE ENCOUNTER
Dr. Felix's office called patient stated they never received fax for referral, requesting someone call the office to work out getting referral       >>Patient contacted the office this morning asking if a new referral for Neurology can be placed in chart and faxed. There is a referral that is in patients chart from 01/18/24 for Neurology to see Dr. Marcos Rodriguez, but PT recommended Dr. Sanju Felix as he could get him in sooner than July. Patient attempted to schedule appt, office is requesting a referral to be sent to their office. Phone number: [phone] Fax number: [phone]. Please let patient know once complete, patient will then contact office back to set up Neurology appt.

## 2024-02-22 NOTE — TELEPHONE ENCOUNTER
Patient calling because neuro referral was not received. Asking for it to be faxed again to this number [phone]

## 2024-02-22 NOTE — TELEPHONE ENCOUNTER
Patient contacted the office this morning asking if a new referral for Neurology can be placed in chart and faxed. There is a referral that is in patients chart from 01/18/24 for Neurology to see Dr. Marcos Rodriguez, but PT recommended Dr. Sanju Felix as he could get him in sooner than July. Patient attempted to schedule appt, office is requesting a referral to be sent to their office. Phone number: [phone] Fax number: [phone]. Please let patient know once complete, patient will then contact office back to set up Neurology appt.

## 2024-02-26 ENCOUNTER — PATIENT OUTREACH (OUTPATIENT)
Dept: FAMILY MEDICINE CLINIC | Facility: CLINIC | Age: 59
End: 2024-02-26

## 2024-02-26 NOTE — PROGRESS NOTES
[facility] contacted [facility] for a status update on the transportation application submitted 2/16/2024. Per [place], patient is approved for PWD services and can utilize the door to door service as long as there is no bus stop within 3/4 miles of his home.   A welcome packet was mailed to his home with information and instructions for scheduling rides.    [facility] attempted to contact Clyde with the above information. No answer. Left message on voicemail with reason for call, the above information, and a request for a call back with any questions of further needs. Informed the referral will remain open for two weeks and if no contact, referral will be closed. He may contact this writer or his PCP for any future needs.     Referral will remain open until end of day 3/11/24 unless patient expressed further needs. No further outreaches will be made.

## 2024-02-27 NOTE — TELEPHONE ENCOUNTER
Spoke to Dr. Felix's office and the first available appointment they have is 8/9/24 at 11:30 am.  He will be put on the cancellation list.  Called patient to let him know.  Patient verbalized understanding.

## 2024-02-27 NOTE — TELEPHONE ENCOUNTER
Pt returning Martha's call. He stated any day during the week is ok for appt as long as it can be between the hours of 10am-2pm. Please call back with appt details at [phone]. Thank you.

## 2024-02-27 NOTE — TELEPHONE ENCOUNTER
Left message for patient to return call to the office regarding his Neurology appointment.  I wanted to know if he needed a certain day or time for his Neurology appointment before I called the office to schedule it.

## 2024-02-27 NOTE — TELEPHONE ENCOUNTER
Patient called in stating that after speaking with the Neurology this morning, they told him that his PCP needs to be the one calling to schedule his first appointment . Dr Sanju Felxi's office [phone]. Please advise .

## 2024-02-28 ENCOUNTER — TELEPHONE (OUTPATIENT)
Dept: OTHER | Facility: OTHER | Age: 59
End: 2024-02-28

## 2024-02-28 ENCOUNTER — TELEPHONE (OUTPATIENT)
Age: 59
End: 2024-02-28

## 2024-02-28 DIAGNOSIS — J06.9 UPPER RESPIRATORY TRACT INFECTION, UNSPECIFIED TYPE: Primary | ICD-10-CM

## 2024-02-28 RX ORDER — AZITHROMYCIN 250 MG/1
TABLET, FILM COATED ORAL DAILY
Qty: 6 TABLET | Refills: 0 | Status: SHIPPED | OUTPATIENT
Start: 2024-02-28 | End: 2024-03-04

## 2024-02-28 NOTE — TELEPHONE ENCOUNTER
Patient would like the office to give him a call back. He has been having cold symptoms and wanted to see if he would possibly be able to get something for it. His best callback number is [phone].

## 2024-02-28 NOTE — TELEPHONE ENCOUNTER
Patient called to follow up regarding his earlier phone call requesting meds for cold symptoms.    I spoke to patient.  His symptoms are as follows:    Stuffed nose for about 1 week-was taking [place], but didn't help  Breaking out in sweats-just happened this morning  Chest congestion-Noticed when he got up this morning    Wanted to know if he could get something prescribed.  [facility]

## 2024-02-28 NOTE — TELEPHONE ENCOUNTER
Pt called back to check on the status of his earlier msg left and expecting a return call. Maximiliano transferred the call to practice clinical was answered by ashok. Thanks

## 2024-02-28 NOTE — TELEPHONE ENCOUNTER
Patient called to make sure it is safe to take the Z-pack with the other medications he is on. He would like someone to reach out to a provider and a call back to advise.

## 2024-03-12 DIAGNOSIS — G35 MULTIPLE SCLEROSIS (HCC): ICD-10-CM

## 2024-03-12 RX ORDER — PREGABALIN 50 MG/1
50 CAPSULE ORAL 2 TIMES DAILY
Qty: 60 CAPSULE | Refills: 0 | Status: SHIPPED | OUTPATIENT
Start: 2024-03-12

## 2024-03-12 NOTE — TELEPHONE ENCOUNTER
Patient calling to update PCP on status of Assisted Living. Was supposed to move, but has a Disability Hearing April 24th and has to wait and see what the final verdict is after the hearing and may not be moving. Will keep updated.

## 2024-03-12 NOTE — TELEPHONE ENCOUNTER
Reason for call:   [x] Refill   [] Prior Auth  [] Other:     Office:   [x] PCP/Provider -   [] Specialty/Provider -     Medication:   Pregablin 50mg- take 1 capsule by mouth 2 times a day      Pharmacy: [facility]    Does the patient have enough for 3 days?   [x] Yes   [] No - Send as HP to POD

## 2024-03-18 ENCOUNTER — TELEPHONE (OUTPATIENT)
Age: 59
End: 2024-03-18

## 2024-03-18 NOTE — TELEPHONE ENCOUNTER
Brigitte from [facility] [place] called stating that she had faxed over paperwork to be filled out on 3/5/24. She states she needs this paperwork faxed back by today, along with updated med list and notes. Fax # [phone] Attn: Brigitte. Please advise.

## 2024-03-18 NOTE — TELEPHONE ENCOUNTER
Received call from [facility] informing that she has not received the requested paperwork. Informed that it was re-faxed this afternoon, but I will request a refax. Please refax asap as their office is impudently requesting the information. Or contact office directly at [phone]

## 2024-04-03 DIAGNOSIS — G35 MULTIPLE SCLEROSIS (HCC): ICD-10-CM

## 2024-04-03 DIAGNOSIS — K21.9 GASTROESOPHAGEAL REFLUX DISEASE WITHOUT ESOPHAGITIS: ICD-10-CM

## 2024-04-03 DIAGNOSIS — N40.1 BENIGN PROSTATIC HYPERPLASIA WITH LOWER URINARY TRACT SYMPTOMS, SYMPTOM DETAILS UNSPECIFIED: ICD-10-CM

## 2024-04-03 RX ORDER — TAMSULOSIN HYDROCHLORIDE 0.4 MG/1
0.4 CAPSULE ORAL 2 TIMES DAILY
Qty: 180 CAPSULE | Refills: 1 | Status: SHIPPED | OUTPATIENT
Start: 2024-04-03

## 2024-04-03 RX ORDER — PREGABALIN 50 MG/1
50 CAPSULE ORAL 2 TIMES DAILY
Qty: 180 CAPSULE | Refills: 1 | Status: SHIPPED | OUTPATIENT
Start: 2024-04-03

## 2024-04-03 NOTE — TELEPHONE ENCOUNTER
Reason for call:   [x] Refill   [] Prior Auth  [] Other:     Office:   [x] PCP/Provider - Tamara Dotson PA-C   [] Specialty/Provider -     Medication: tamsulosin (FLOMAX) 0.4 mg     Dose/Frequency: Take 1 capsule (0.4 mg total) by mouth 2 (two) times a day     Quantity: 180 + 1 refill    Pharmacy: [facility]/pharmacy #[id number] - [place] - [address]     Does the patient have enough for 3 days?   [] Yes   [x] No - Send as HP to POD

## 2024-04-03 NOTE — TELEPHONE ENCOUNTER
Reason for call:   [x] Refill   [] Prior Auth  [] Other:     Office:   [x] PCP/Provider - Ko Castro PA-C   [] Specialty/Provider -     Medication: pregabalin (LYRICA) 50 mg capsule     Dose/Frequency: Take 1 capsule (50 mg total) by mouth 2 (two) times a day     Quantity: 180 + 1 refill    Pharmacy: [facility]/pharmacy #[id number] - [place] - [address]     Does the patient have enough for 3 days?   [] Yes   [x] No - Send as HP to POD

## 2024-05-09 ENCOUNTER — TELEPHONE (OUTPATIENT)
Dept: FAMILY MEDICINE CLINIC | Facility: CLINIC | Age: 59
End: 2024-05-09

## 2024-05-09 NOTE — TELEPHONE ENCOUNTER
Pt returning callback. He advised insurance is the same. Added insurance to appt and checked rte. Insurance e-verified.

## 2024-05-09 NOTE — TELEPHONE ENCOUNTER
LVM asking for updated ins info  Please input into chart and remind pt to bring cards into upcoming visit

## 2024-05-13 ENCOUNTER — TELEPHONE (OUTPATIENT)
Dept: OTHER | Facility: OTHER | Age: 59
End: 2024-05-13

## 2024-05-13 NOTE — TELEPHONE ENCOUNTER
Patient Called, request an earlier date and time for Scheduled PHY 05/15/2024. Upon Chart review/appointments, confirmed availability of Providers. Patient request either 05-13/2024, 05/14/2024 of earlier times for appointment. Please advise Patient at  [phone] , if any further questions.

## 2024-05-13 NOTE — TELEPHONE ENCOUNTER
Patient has an apt with Dr. Castro on Wednesday for a physical.  He is looking to reschedule it for tomorrow morning.  I did state that it did not look like the doctor had anything and that he was not in office today.  I stated that I would leave a message for the office. Please call patient back. Thank you

## 2024-05-15 ENCOUNTER — OFFICE VISIT (OUTPATIENT)
Dept: FAMILY MEDICINE CLINIC | Facility: CLINIC | Age: 59
End: 2024-05-15
Payer: COMMERCIAL

## 2024-05-15 VITALS
HEART RATE: 65 BPM | TEMPERATURE: 96.8 F | DIASTOLIC BLOOD PRESSURE: 74 MMHG | WEIGHT: 222 LBS | BODY MASS INDEX: 30.96 KG/M2 | SYSTOLIC BLOOD PRESSURE: 118 MMHG | OXYGEN SATURATION: 98 %

## 2024-05-15 DIAGNOSIS — Z12.11 SCREEN FOR COLON CANCER: ICD-10-CM

## 2024-05-15 DIAGNOSIS — G47.09 OTHER INSOMNIA: ICD-10-CM

## 2024-05-15 DIAGNOSIS — E55.9 VITAMIN D DEFICIENCY: Primary | ICD-10-CM

## 2024-05-15 DIAGNOSIS — H01.002 BLEPHARITIS OF LOWER EYELIDS OF BOTH EYES, UNSPECIFIED TYPE: ICD-10-CM

## 2024-05-15 DIAGNOSIS — E53.8 B12 DEFICIENCY: ICD-10-CM

## 2024-05-15 DIAGNOSIS — G35 MULTIPLE SCLEROSIS (HCC): ICD-10-CM

## 2024-05-15 DIAGNOSIS — K59.09 OTHER CONSTIPATION: ICD-10-CM

## 2024-05-15 DIAGNOSIS — R60.0 BILATERAL LOWER EXTREMITY EDEMA: ICD-10-CM

## 2024-05-15 DIAGNOSIS — K21.9 GASTROESOPHAGEAL REFLUX DISEASE WITHOUT ESOPHAGITIS: ICD-10-CM

## 2024-05-15 DIAGNOSIS — N40.1 BENIGN PROSTATIC HYPERPLASIA WITH LOWER URINARY TRACT SYMPTOMS, SYMPTOM DETAILS UNSPECIFIED: ICD-10-CM

## 2024-05-15 DIAGNOSIS — H01.005 BLEPHARITIS OF LOWER EYELIDS OF BOTH EYES, UNSPECIFIED TYPE: ICD-10-CM

## 2024-05-15 PROCEDURE — 99214 OFFICE O/P EST MOD 30 MIN: CPT

## 2024-05-15 RX ORDER — PANTOPRAZOLE SODIUM 40 MG/1
40 TABLET, DELAYED RELEASE ORAL EVERY MORNING
Qty: 90 TABLET | Refills: 1 | Status: SHIPPED | OUTPATIENT
Start: 2024-05-15

## 2024-05-15 RX ORDER — SENNOSIDES A AND B 8.6 MG/1
1 TABLET, FILM COATED ORAL 2 TIMES DAILY
Qty: 60 TABLET | Refills: 2 | Status: SHIPPED | OUTPATIENT
Start: 2024-05-15

## 2024-05-15 RX ORDER — ERYTHROMYCIN 5 MG/G
0.5 OINTMENT OPHTHALMIC EVERY 6 HOURS SCHEDULED
Qty: 28 G | Refills: 1 | Status: SHIPPED | OUTPATIENT
Start: 2024-05-15

## 2024-05-15 RX ORDER — OMEGA-3S/DHA/EPA/FISH OIL/D3 300MG-1000
400 CAPSULE ORAL DAILY
Qty: 90 TABLET | Refills: 1 | Status: SHIPPED | OUTPATIENT
Start: 2024-05-15

## 2024-05-15 RX ORDER — PREGABALIN 50 MG/1
50 CAPSULE ORAL 2 TIMES DAILY
Qty: 180 CAPSULE | Refills: 1 | Status: SHIPPED | OUTPATIENT
Start: 2024-05-15

## 2024-05-15 RX ORDER — FUROSEMIDE 20 MG/1
20 TABLET ORAL EVERY MORNING
Qty: 90 TABLET | Refills: 1 | Status: SHIPPED | OUTPATIENT
Start: 2024-05-15

## 2024-05-15 RX ORDER — SACCHAROMYCES BOULARDII 250 MG
250 CAPSULE ORAL 2 TIMES DAILY
Qty: 180 CAPSULE | Refills: 1 | Status: SHIPPED | OUTPATIENT
Start: 2024-05-15

## 2024-05-15 RX ORDER — HYDROXYZINE HYDROCHLORIDE 25 MG/1
25 TABLET, FILM COATED ORAL
Qty: 90 TABLET | Refills: 1 | Status: SHIPPED | OUTPATIENT
Start: 2024-05-15

## 2024-05-15 RX ORDER — LANOLIN ALCOHOL/MO/W.PET/CERES
1000 CREAM (GRAM) TOPICAL DAILY
Qty: 90 TABLET | Refills: 0 | Status: SHIPPED | OUTPATIENT
Start: 2024-05-15

## 2024-05-15 RX ORDER — TAMSULOSIN HYDROCHLORIDE 0.4 MG/1
0.4 CAPSULE ORAL 2 TIMES DAILY
Qty: 180 CAPSULE | Refills: 1 | Status: SHIPPED | OUTPATIENT
Start: 2024-05-15

## 2024-05-15 NOTE — ASSESSMENT & PLAN NOTE
Patient has PT/OT and home health.  Patient is not currently on medication or medical treatment for MS, but has follow-up with neurology.  Contact office with any worsening.

## 2024-05-15 NOTE — PROGRESS NOTES
Ambulatory Visit  Name: Clyde Malave      : 1965      MRN: [id number]  Encounter Provider: Ko Castro PA-C  Encounter Date: 5/15/2024   Encounter department: [facility]    Assessment & Plan   1. Vitamin D deficiency  Assessment & Plan:  Stable.  Continue D3 supplement.  Orders:  -     cholecalciferol (VITAMIN D3) 400 units tablet; Take 1 tablet (400 Units total) by mouth daily  2. Bilateral lower extremity edema  Assessment & Plan:  Echocardiogram and lab work normal.  Patient educated on importance of compression socks and leg elevation along with a low-sodium/sugar diet.  Patient is to contact office or go to emergency room if shortness of breath or severe leg swelling occurs.  Orders:  -     furosemide (LASIX) 20 mg tablet; Take 1 tablet (20 mg total) by mouth every morning  3. Other insomnia  Assessment & Plan:  Stable on hydroxyzine 25 mg at bedtime. Continue treatment.   Orders:  -     hydrOXYzine HCL (ATARAX) 25 mg tablet; Take 1 tablet (25 mg total) by mouth daily at bedtime  4. Gastroesophageal reflux disease without esophagitis  Assessment & Plan:  Stable. Continue pantoprazole 40 mg daily. Contact office with symptoms.   Orders:  -     pantoprazole (PROTONIX) 40 mg tablet; Take 1 tablet (40 mg total) by mouth every morning  5. Multiple sclerosis (HCC)  Assessment & Plan:  Patient has PT/OT and home health.  Patient is not currently on medication or medical treatment for MS, but has follow-up with neurology.  Contact office with any worsening.  Orders:  -     pregabalin (LYRICA) 50 mg capsule; Take 1 capsule (50 mg total) by mouth 2 (two) times a day  6. Other constipation  Assessment & Plan:  Stable.  Continue senna 2 times daily.  Orders:  -     saccharomyces boulardii (FLORASTOR) 250 mg capsule; Take 1 capsule (250 mg total) by mouth 2 (two) times a day  -     senna (SENOKOT) 8.6 MG tablet; Take 1 tablet (8.6 mg total) by mouth 2 (two) times a day  7. Benign  prostatic hyperplasia with lower urinary tract symptoms, symptom details unspecified  Assessment & Plan:  Patient is stable on Flomax 0.4 mg 2 tablets daily with dinner.  Is following with urology.  Contact office with symptoms.  Orders:  -     tamsulosin (FLOMAX) 0.4 mg; Take 1 capsule (0.4 mg total) by mouth 2 (two) times a day  8. B12 deficiency  Assessment & Plan:  Stable.  Continue B12 supplement.  Orders:  -     vitamin B-12 (VITAMIN B-12) 1,000 mcg tablet; Take 1 tablet (1,000 mcg total) by mouth daily  9. Blepharitis of lower eyelids of both eyes, unspecified type  Assessment & Plan:  Patient prescribed erythromycin ointment 4 times a day for 1 week.  Patient is educated on using warm compresses, as this will help ultimately.  Patient is to contact office and 1 to 2 weeks if no improvement or worsening.  Orders:  -     erythromycin (ILOTYCIN) ophthalmic ointment; Administer 0.5 inches to both eyes every 6 (six) hours  10. Screen for colon cancer  Comments:  Completed last year in [place].  Patient states he was good for 3 years after colonoscopy was done last year.  Will be looking and submitting care gap.       History of Present Illness     Patient is a 57y/o male presenting for [facility] follow up.  He states he has had eyelid redness and irritation for about 1 week.  Denies discharge, crusting, photophobia, double vision, visual disturbances.  Patient has not tried anything for this.  Patient has no other concerns today.        Review of Systems   Constitutional:  Negative for appetite change, chills, diaphoresis, fatigue and fever.   HENT:  Negative for congestion, ear discharge, ear pain, postnasal drip, rhinorrhea, sinus pressure, sinus pain, sneezing and sore throat.    Eyes:  Positive for redness. Negative for pain, discharge, itching and visual disturbance.   Respiratory:  Negative for apnea, cough, chest tightness, shortness of breath and wheezing.    Cardiovascular:  Negative for chest pain,  palpitations and leg swelling.   Gastrointestinal:  Negative for abdominal pain, blood in stool, constipation, diarrhea, nausea and vomiting.   Endocrine: Negative for cold intolerance, heat intolerance, polydipsia and polyuria.   Genitourinary:  Negative for dysuria, flank pain, frequency, hematuria and urgency.   Musculoskeletal:  Negative for arthralgias, back pain, myalgias, neck pain and neck stiffness.   Skin:  Negative for color change and rash.   Allergic/Immunologic: Negative.    Neurological:  Negative for dizziness, tremors, seizures, syncope, facial asymmetry, speech difficulty, weakness, light-headedness, numbness and headaches.   Hematological:  Negative for adenopathy. Does not bruise/bleed easily.   Psychiatric/Behavioral:  Negative for agitation, confusion, decreased concentration, dysphoric mood, hallucinations, self-injury, sleep disturbance and suicidal ideas. The patient is not nervous/anxious and is not hyperactive.    All other systems reviewed and are negative.      Objective     /74 (BP Location: Left arm, Patient Position: Sitting)   Pulse 65   Temp (!) 96.8 °F (36 °C) (Tympanic)   Wt 101 kg (222 lb)   SpO2 98%   BMI 30.96 kg/m²     Physical Exam  Vitals and nursing note reviewed.   Constitutional:       General: He is not in acute distress.     Appearance: Normal appearance. He is well-developed. He is obese. He is not ill-appearing, toxic-appearing or diaphoretic.   HENT:      Head: Normocephalic and atraumatic.      Right Ear: Tympanic membrane normal.      Left Ear: Tympanic membrane normal.      Nose: Nose normal.      Mouth/Throat:      Mouth: Mucous membranes are moist.      Pharynx: Oropharynx is clear.   Eyes:      Extraocular Movements: Extraocular movements intact.      Conjunctiva/sclera: Conjunctivae normal.      Pupils: Pupils are equal, round, and reactive to light.   Cardiovascular:      Rate and Rhythm: Normal rate and regular rhythm.      Pulses: Normal pulses.       Heart sounds: Normal heart sounds. No murmur heard.  Pulmonary:      Effort: Pulmonary effort is normal. No respiratory distress.      Breath sounds: Normal breath sounds. No wheezing.   Chest:      Chest wall: No tenderness.   Abdominal:      General: Bowel sounds are normal.      Palpations: Abdomen is soft. There is no mass.      Tenderness: There is no abdominal tenderness.   Musculoskeletal:         General: No swelling or tenderness. Normal range of motion.      Cervical back: Normal range of motion and neck supple. No tenderness.      Right lower leg: No edema.      Left lower leg: No edema.   Lymphadenopathy:      Cervical: No cervical adenopathy.   Skin:     General: Skin is warm and dry.      Capillary Refill: Capillary refill takes less than 2 seconds.      Findings: No erythema, lesion or rash.   Neurological:      General: No focal deficit present.      Mental Status: He is alert and oriented to person, place, and time. Mental status is at baseline.      Cranial Nerves: No cranial nerve deficit.      Motor: No weakness.      Coordination: Coordination normal.      Gait: Gait abnormal.   Psychiatric:         Mood and Affect: Mood normal.         Behavior: Behavior normal.         Thought Content: Thought content normal.         Judgment: Judgment normal.       Administrative Statements

## 2024-05-15 NOTE — ASSESSMENT & PLAN NOTE
Patient is stable on Flomax 0.4 mg 2 tablets daily with dinner.  Is following with urology.  Contact office with symptoms.

## 2024-05-15 NOTE — ASSESSMENT & PLAN NOTE
Patient prescribed erythromycin ointment 4 times a day for 1 week.  Patient is educated on using warm compresses, as this will help ultimately.  Patient is to contact office and 1 to 2 weeks if no improvement or worsening.

## 2024-05-24 ENCOUNTER — TELEPHONE (OUTPATIENT)
Age: 59
End: 2024-05-24

## 2024-05-24 NOTE — TELEPHONE ENCOUNTER
Patient informed Dr. Castro he will be the in [place] for a couple of months and is requesting OPT and PT orders from 1/18/24 be fax to [place]    [facility]  Attn: Mohit Villela  B: [phone]  F: [phone]    Please review and advice  Thank you

## 2024-05-24 NOTE — TELEPHONE ENCOUNTER
Rcvd call from patient regarding his request for PT/OT in [place]. Wanted to know why the order was not faxed to the facility in [place] yet. Told him that I don't know, I see his message from this am and know the message was given to Ko. Patient states Ko does not have to do anything b/c he already wrote the order and someone should be able to just fax it to the [place] facility. Explained that maybe there is a delay b/c the order is going to another state. Explained different rules and regulations from state to state. Patient states that he knows about all of that and it just needs to be faxed. Asked if he could come by the office and  a script. Explained that they are seeing patient's and they may not have time to stop and write out a script. Asked him if he had a deadline that it had to be done today. He said no, he just wants to make sure it gets done. States he will call back later today and Tuesday to see if it was done. Verified all of the contact info he gave on last call.

## 2024-11-16 DIAGNOSIS — G35 MULTIPLE SCLEROSIS (HCC): ICD-10-CM

## 2024-11-18 ENCOUNTER — TELEPHONE (OUTPATIENT)
Dept: FAMILY MEDICINE CLINIC | Facility: CLINIC | Age: 59
End: 2024-11-18

## 2024-11-18 RX ORDER — PREGABALIN 50 MG/1
50 CAPSULE ORAL 2 TIMES DAILY
Qty: 60 CAPSULE | Refills: 0 | Status: SHIPPED | OUTPATIENT
Start: 2024-11-18

## 2024-11-18 NOTE — TELEPHONE ENCOUNTER
Was informed by pt that he moved to [place] and will no longer be coming to our offfice.  Please remove us a PCP

## 2024-11-27 NOTE — TELEPHONE ENCOUNTER
11/27/24 12:07 PM        The office's request has been received, reviewed, and the patient chart updated. The PCP has successfully been removed with a patient attribution note. This message will now be completed.        Thank you  Cedric Blackmon

## 2024-12-14 DIAGNOSIS — G35 MULTIPLE SCLEROSIS (HCC): ICD-10-CM

## 2024-12-16 RX ORDER — PREGABALIN 50 MG/1
50 CAPSULE ORAL 2 TIMES DAILY
Qty: 60 CAPSULE | Refills: 0 | Status: SHIPPED | OUTPATIENT
Start: 2024-12-16

## 2025-01-12 DIAGNOSIS — G35 MULTIPLE SCLEROSIS (HCC): ICD-10-CM

## 2025-01-14 RX ORDER — PREGABALIN 50 MG/1
50 CAPSULE ORAL 2 TIMES DAILY
Qty: 60 CAPSULE | Refills: 0 | OUTPATIENT
Start: 2025-01-14

## 2025-01-14 NOTE — TELEPHONE ENCOUNTER
Patient needs appointment every 6 months for medication and not filled according to PDMP since April 2024.

## 2025-01-17 DIAGNOSIS — G35 MULTIPLE SCLEROSIS (HCC): ICD-10-CM

## 2025-01-17 RX ORDER — PREGABALIN 50 MG/1
50 CAPSULE ORAL 2 TIMES DAILY
Qty: 60 CAPSULE | Refills: 0 | OUTPATIENT
Start: 2025-01-17

## 2025-01-17 NOTE — TELEPHONE ENCOUNTER
Requested medication(s) are due for refill today: Yes  Patient has already received a courtesy refill: No  Other reason request has been forwarded to provider: I think pt moved back to [place]

## 2025-02-22 DIAGNOSIS — K21.9 GASTROESOPHAGEAL REFLUX DISEASE WITHOUT ESOPHAGITIS: ICD-10-CM

## 2025-02-23 RX ORDER — PANTOPRAZOLE SODIUM 40 MG/1
40 TABLET, DELAYED RELEASE ORAL EVERY MORNING
Qty: 90 TABLET | Refills: 1 | Status: SHIPPED | OUTPATIENT
Start: 2025-02-23